# Patient Record
Sex: MALE | Race: WHITE | NOT HISPANIC OR LATINO
[De-identification: names, ages, dates, MRNs, and addresses within clinical notes are randomized per-mention and may not be internally consistent; named-entity substitution may affect disease eponyms.]

---

## 2018-08-22 ENCOUNTER — RECORDS - HEALTHEAST (OUTPATIENT)
Dept: ADMINISTRATIVE | Facility: OTHER | Age: 53
End: 2018-08-22

## 2018-08-27 ENCOUNTER — OFFICE VISIT - HEALTHEAST (OUTPATIENT)
Dept: CARDIOLOGY | Facility: CLINIC | Age: 53
End: 2018-08-27

## 2018-08-27 DIAGNOSIS — I71.20 THORACIC AORTIC ANEURYSM WITHOUT RUPTURE (H): ICD-10-CM

## 2018-08-27 DIAGNOSIS — I71.40 ABDOMINAL AORTIC ANEURYSM (AAA) WITHOUT RUPTURE (H): ICD-10-CM

## 2018-08-27 ASSESSMENT — MIFFLIN-ST. JEOR: SCORE: 1546.14

## 2018-09-05 ENCOUNTER — HOSPITAL ENCOUNTER (OUTPATIENT)
Dept: CARDIOLOGY | Facility: HOSPITAL | Age: 53
Discharge: HOME | End: 2018-09-05
Attending: INTERNAL MEDICINE

## 2018-09-05 DIAGNOSIS — I71.20 THORACIC AORTIC ANEURYSM WITHOUT RUPTURE (H): ICD-10-CM

## 2018-09-05 LAB
AORTIC ROOT: 3.1 CM
AORTIC VALVE MEAN VELOCITY: 83.4 CM/S
ASCENDING AORTA: 3.5 CM
AV DIMENSIONLESS INDEX VTI: 0.7
AV MEAN GRADIENT: 3 MMHG
AV PEAK GRADIENT: 6.1 MMHG
AV VALVE AREA: 3 CM2
AV VELOCITY RATIO: 0.7
BSA FOR ECHO PROCEDURE: 1.88 M2
CV BLOOD PRESSURE: NORMAL MMHG
CV ECHO HEIGHT: 69 IN
CV ECHO WEIGHT: 160 LBS
DOP CALC AO PEAK VEL: 123 CM/S
DOP CALC AO VTI: 22.9 CM
DOP CALC LVOT AREA: 4.15 CM2
DOP CALC LVOT DIAMETER: 2.3 CM
DOP CALC LVOT PEAK VEL: 84.2 CM/S
DOP CALC LVOT STROKE VOLUME: 68.1 CM3
DOP CALCLVOT PEAK VEL VTI: 16.4 CM
EJECTION FRACTION: 61 % (ref 55–75)
FRACTIONAL SHORTENING: 41.7 % (ref 28–44)
INTERVENTRICULAR SEPTUM IN END DIASTOLE: 0.89 CM (ref 0.6–1)
IVS/PW RATIO: 1.1
LEFT ATRIUM SIZE: 3.5 CM
LEFT VENTRICLE CARDIAC INDEX: 3.1 L/MIN/M2
LEFT VENTRICLE CARDIAC OUTPUT: 5.9 L/MIN
LEFT VENTRICLE DIASTOLIC VOLUME INDEX: 33.6 CM3/M2 (ref 34–74)
LEFT VENTRICLE DIASTOLIC VOLUME: 63.1 CM3 (ref 62–150)
LEFT VENTRICLE HEART RATE: 86 BPM
LEFT VENTRICLE MASS INDEX: 72.9 G/M2
LEFT VENTRICLE SYSTOLIC VOLUME INDEX: 13 CM3/M2 (ref 11–31)
LEFT VENTRICLE SYSTOLIC VOLUME: 24.5 CM3 (ref 21–61)
LEFT VENTRICULAR INTERNAL DIMENSION IN DIASTOLE: 4.87 CM (ref 4.2–5.8)
LEFT VENTRICULAR INTERNAL DIMENSION IN SYSTOLE: 2.84 CM (ref 2.5–4)
LEFT VENTRICULAR MASS: 137 G
LEFT VENTRICULAR OUTFLOW TRACT MEAN GRADIENT: 1 MMHG
LEFT VENTRICULAR OUTFLOW TRACT MEAN VELOCITY: 53.9 CM/S
LEFT VENTRICULAR OUTFLOW TRACT PEAK GRADIENT: 3 MMHG
LEFT VENTRICULAR POSTERIOR WALL IN END DIASTOLE: 0.78 CM (ref 0.6–1)
LV STROKE VOLUME INDEX: 36.2 ML/M2
MITRAL VALVE DECELERATION SLOPE: 2160 MM/S2
MITRAL VALVE E/A RATIO: 0.8
MITRAL VALVE PRESSURE HALF-TIME: 79 MS
MV AVERAGE E/E' RATIO: 7 CM/S
MV DECELERATION TIME: 271 MS
MV E'TISSUE VEL-LAT: 8.32 CM/S
MV E'TISSUE VEL-MED: 8.41 CM/S
MV LATERAL E/E' RATIO: 7
MV MEDIAL E/E' RATIO: 7
MV PEAK A VELOCITY: 77.7 CM/S
MV PEAK E VELOCITY: 58.5 CM/S
MV VALVE AREA PRESSURE 1/2 METHOD: 2.8 CM2
NUC REST DIASTOLIC VOLUME INDEX: 2560 LBS
NUC REST SYSTOLIC VOLUME INDEX: 69 IN
TRICUSPID VALVE ANULAR PLANE SYSTOLIC EXCURSION: 3 CM

## 2018-09-05 ASSESSMENT — MIFFLIN-ST. JEOR: SCORE: 1546.14

## 2018-09-14 ENCOUNTER — HOSPITAL ENCOUNTER (OUTPATIENT)
Dept: CT IMAGING | Facility: HOSPITAL | Age: 53
Discharge: HOME | End: 2018-09-14
Attending: INTERNAL MEDICINE

## 2018-09-14 DIAGNOSIS — I71.20 THORACIC AORTIC ANEURYSM WITHOUT RUPTURE (H): ICD-10-CM

## 2020-10-12 ENCOUNTER — OFFICE VISIT - HEALTHEAST (OUTPATIENT)
Dept: FAMILY MEDICINE | Facility: CLINIC | Age: 55
End: 2020-10-12

## 2020-10-12 DIAGNOSIS — Z12.5 SCREENING FOR PROSTATE CANCER: ICD-10-CM

## 2020-10-12 DIAGNOSIS — N50.89 LUMP IN THE TESTICLE: ICD-10-CM

## 2020-10-12 DIAGNOSIS — Z11.3 SCREENING EXAMINATION FOR SEXUALLY TRANSMITTED DISEASE: ICD-10-CM

## 2020-10-12 DIAGNOSIS — F15.10 AMPHETAMINE USE DISORDER, MILD (H): ICD-10-CM

## 2020-10-12 DIAGNOSIS — Z13.220 LIPID SCREENING: ICD-10-CM

## 2020-10-12 LAB
CHOLEST SERPL-MCNC: 171 MG/DL
FASTING STATUS PATIENT QL REPORTED: NO
HDLC SERPL-MCNC: 47 MG/DL
HIV 1+2 AB+HIV1 P24 AG SERPL QL IA: NEGATIVE
LDLC SERPL CALC-MCNC: 94 MG/DL
PSA SERPL-MCNC: 1.3 NG/ML (ref 0–3.5)
TRIGL SERPL-MCNC: 149 MG/DL

## 2020-10-12 ASSESSMENT — MIFFLIN-ST. JEOR: SCORE: 1560.87

## 2020-10-13 ENCOUNTER — COMMUNICATION - HEALTHEAST (OUTPATIENT)
Dept: FAMILY MEDICINE | Facility: CLINIC | Age: 55
End: 2020-10-13

## 2020-10-13 LAB
C TRACH DNA SPEC QL PROBE+SIG AMP: NEGATIVE
HCV AB SERPL QL IA: NEGATIVE
N GONORRHOEA DNA SPEC QL NAA+PROBE: NEGATIVE
T PALLIDUM AB SER QL: NEGATIVE

## 2020-10-16 ENCOUNTER — COMMUNICATION - HEALTHEAST (OUTPATIENT)
Dept: FAMILY MEDICINE | Facility: CLINIC | Age: 55
End: 2020-10-16

## 2020-10-21 ENCOUNTER — HOSPITAL ENCOUNTER (OUTPATIENT)
Dept: ULTRASOUND IMAGING | Facility: HOSPITAL | Age: 55
Discharge: HOME | End: 2020-10-21
Attending: FAMILY MEDICINE

## 2020-10-21 DIAGNOSIS — N50.89 LUMP IN THE TESTICLE: ICD-10-CM

## 2020-10-22 ENCOUNTER — COMMUNICATION - HEALTHEAST (OUTPATIENT)
Dept: FAMILY MEDICINE | Facility: CLINIC | Age: 55
End: 2020-10-22

## 2021-01-28 ENCOUNTER — OFFICE VISIT - HEALTHEAST (OUTPATIENT)
Dept: FAMILY MEDICINE | Facility: CLINIC | Age: 56
End: 2021-01-28

## 2021-01-28 DIAGNOSIS — Z00.00 ROUTINE GENERAL MEDICAL EXAMINATION AT A HEALTH CARE FACILITY: ICD-10-CM

## 2021-01-28 DIAGNOSIS — F15.10 AMPHETAMINE USE DISORDER, MILD (H): ICD-10-CM

## 2021-01-28 DIAGNOSIS — F17.210 CIGARETTE NICOTINE DEPENDENCE WITHOUT COMPLICATION: ICD-10-CM

## 2021-01-28 DIAGNOSIS — Z12.11 SCREEN FOR COLON CANCER: ICD-10-CM

## 2021-01-28 ASSESSMENT — MIFFLIN-ST. JEOR: SCORE: 1570.39

## 2021-06-01 VITALS — BODY MASS INDEX: 23.7 KG/M2 | HEIGHT: 69 IN | WEIGHT: 160 LBS

## 2021-06-05 VITALS
HEIGHT: 70 IN | HEART RATE: 93 BPM | WEIGHT: 162.2 LBS | BODY MASS INDEX: 23.22 KG/M2 | SYSTOLIC BLOOD PRESSURE: 134 MMHG | DIASTOLIC BLOOD PRESSURE: 58 MMHG

## 2021-06-05 VITALS
HEIGHT: 70 IN | SYSTOLIC BLOOD PRESSURE: 110 MMHG | DIASTOLIC BLOOD PRESSURE: 70 MMHG | BODY MASS INDEX: 22.82 KG/M2 | WEIGHT: 159.4 LBS | HEART RATE: 86 BPM

## 2021-06-12 NOTE — TELEPHONE ENCOUNTER
Please advise.    Component      Latest Ref Rng & Units 10/12/2020   Cholesterol      <=199 mg/dL 171   Triglycerides      <=149 mg/dL 149   HDL Cholesterol      >=40 mg/dL 47   LDL Calculated      <=129 mg/dL 94   Patient Fasting > 8hrs?       No   Chlamydia trachomatis, Amplified Detection      Negative Negative   Neisseria gonorrhoeae, Amplified Detection      Negative Negative   Treponema Antibody (Syphilis)      Negative Negative   HIV Antigen / Antibody      Negative Negative   Hepatitis C Ab      Negative Negative   PSA      0.0 - 3.5 ng/mL 1.3

## 2021-06-12 NOTE — TELEPHONE ENCOUNTER
Test Results  Who is calling?:  Patient  Who ordered the test:  Dang Brunson MD  Type of test: Lab  Date of test:  10/12/2020  Where was the test performed:  In clinic  What are your questions/concerns?:  Pt would like results  Okay to leave a detailed message?:  No

## 2021-06-12 NOTE — PROGRESS NOTES
"  Assessment/Plan:       1. Lump in the testicle  Recommended an ultrasound for further investigation.  This was ordered today.  - US Scrotum and Testicles W Duplex Ltd; Future    2. Screening examination for sexually transmitted disease  After discussion of available options, patient wishes to have the below sexually transmitted infection screening performed.  - Syphilis Screen, Cascade  - HIV Antigen/Antibody Screening Cascade  - Chlamydia trachomatis & Neisseria gonorrhoeae, Amplified Detection  - Hepatitis C Antibody (Anti-HCV)    3. Screening for prostate cancer  After discussion of risks and benefits, patient wishes to have PSA screening performed.  His prostate exam is normal today.  - PSA, Annual Screen (Prostatic-Specific Antigen)    4. Lipid screening  Random lipid profile updated today as we are drawing labs anyway.  - Lipid Cascade RANDOM    5. Amphetamine use disorder, mild (H)  Patient reports that he continues to use methamphetamine and THC approximately once weekly.        Subjective:       Jacob Duncan is a 55 y.o. male who presents for a visit to discuss \"a rectal issue\".  He also would like to have sexually transmitted infection testing performed.  He denies any ongoing symptoms, states his girlfriend made him come in today for a prostate check.  It sounds like she just wanted him to have a routine checkup.  He states that he does not get up more than once at night to urinate.  He has some urinary urgency, no weak stream, no trouble starting his stream.  He has no known family history of prostate cancer.  He has had one new sexual partner, denies any symptoms of sexually transmitted infections.  He continues to use methamphetamine and marijuana approximately once weekly he states.  He denies any IV drug use.  He has had a lump in his left testicle that seems to be enlarging over time he states.  This can be painful.  He thinks his last colonoscopy was done about 6 years ago and he thinks this was " "normal.  Had him sign a release of information for these records today.  He also continues to smoke 3 cigarettes daily.    The following portions of the patient's history were reviewed and updated as appropriate: allergies, current medications, past family history, past medical history, past social history, past surgical history and problem list.      Current Outpatient Medications:      albuterol (PROVENTIL HFA;VENTOLIN HFA) 90 mcg/actuation inhaler, Inhale 2 puffs every 6 (six) hours as needed for wheezing., Disp: 1 Inhaler, Rfl: 3     prazosin (MINIPRESS) 1 MG capsule, Take 3 capsules (3 mg total) by mouth bedtime. Take one mg  At bedtime for a week, then 2 mg at bedtime for a week, then 3 mg at bedtime, Disp: 70 capsule, Rfl: 0     sertraline (ZOLOFT) 50 MG tablet, Take 1 tablet (50 mg total) by mouth daily., Disp: 30 tablet, Rfl: 0    Review of Systems   A 12 point comprehensive review of systems was negative except as noted.      Objective:      /70 (Patient Site: Left Arm, Patient Position: Sitting, Cuff Size: Adult Regular)   Pulse 86   Ht 5' 10.1\" (1.781 m)   Wt 159 lb 6.4 oz (72.3 kg)   BMI 22.81 kg/m      General appearance: alert, appears stated age and cooperative  Head: Normocephalic, without obvious abnormality, atraumatic  Eyes: Conjunctivae clear, sclerae anicteric  Lungs: clear to auscultation bilaterally  Heart: regular rate and rhythm, S1, S2 normal, no murmur, click, rub or gallop  Male genitalia: Penis appears normal, circumcised, no discharge; right testicle palpates normally, left testicle with a round, firm lump in the inferior portion of the testicle, approximately 2 cm in diameter; no palpable inguinal hernias  Rectal: normal tone, normal prostate, no masses or tenderness  Neurologic: Grossly normal          "
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
## 2021-06-16 PROBLEM — N50.89 LUMP IN THE TESTICLE: Status: ACTIVE | Noted: 2020-10-12

## 2021-06-16 PROBLEM — F17.210 CIGARETTE NICOTINE DEPENDENCE WITHOUT COMPLICATION: Status: ACTIVE | Noted: 2021-01-28

## 2021-06-20 NOTE — LETTER
Letter by Dang Brunson MD at      Author: Dang Brunson MD Service: -- Author Type: --    Filed:  Encounter Date: 10/13/2020 Status: (Other)         Jacob Duncan  [address] [facility] [address]             October 13, 2020         Dear Mr. Duncan,    Below are the results from your recent visit:    Resulted Orders   Lipid Cascade RANDOM   Result Value Ref Range    Cholesterol 171 <=199 mg/dL    Triglycerides 149 <=149 mg/dL    HDL Cholesterol 47 >=40 mg/dL    LDL Calculated 94 <=129 mg/dL    Patient Fasting > 8hrs? No    Syphilis Screen, Cascade   Result Value Ref Range    Treponema Antibody (Syphilis) Negative Negative   HIV Antigen/Antibody Screening Cascade   Result Value Ref Range    HIV Antigen / Antibody Negative Negative    Narrative    Method is Abbott HIV Ag/Ab for the detection of HIV p24 antigen, HIV-1 antibodies and HIV-2 antibodies.   Chlamydia trachomatis & Neisseria gonorrhoeae, Amplified Detection   Result Value Ref Range    Chlamydia trachomatis, Amplified Detection Negative Negative    Neisseria gonorrhoeae, Amplified Detection Negative Negative   Hepatitis C Antibody (Anti-HCV)   Result Value Ref Range    Hepatitis C Ab Negative Negative   PSA, Annual Screen (Prostatic-Specific Antigen)   Result Value Ref Range    PSA 1.3 0.0 - 3.5 ng/mL    Narrative    Method is Abbott Prostate-Specific Antigen (PSA)  Standard-WHO 1st International (90:10)       Your sexually transmitted infection testing all returned normal/negative.  Your prostate test (PSA) is normal.  Your cholesterol is within goal range also.    Please call with questions or contact us using [employer]    Sincerely,        Electronically signed by Dang Brunson MD

## 2021-06-21 NOTE — LETTER
Letter by Dang Brunson MD at      Author: Dang Brunson MD Service: -- Author Type: --    Filed:  Encounter Date: 10/22/2020 Status: (Other)         Jacob Duncan  [address] [facility] [address]             October 22, 2020         Dear Mr. Duncan,    Below are the results from your recent visit:    Resulted Orders   US Scrotum and Testicles W Duplex Ltd    Narrative    EXAM: US SCROTUM AND TESTICLES WITH DUPLEX LIMITED  LOCATION: [facility]  DATE/TIME: 10/21/2020 1:53 PM    INDICATION: Enlarging lump inferior left testicle.  COMPARISON: 04/08/2016.  TECHNIQUE: Ultrasound of scrotum with color flow and spectral Doppler with waveform analysis performed.    FINDINGS:    RIGHT: Right testicle measures 4.4 x 2.6 x 2.7 cm. Normal testicle with no masses. Normal arterial duplex and normal color flow. Normal epididymis. No hydrocele. No varicocele. Small 4 mm right scrotal khushbu, incidental.    LEFT: Left testicle measures 3.8 x 2.4 x 2.4 cm. Normal testicle with no masses. Normal arterial duplex and normal color flow. Asymmetric enlargement and heterogeneity of the left epididymis. Perhaps modest epididymal hypervascularity. Inferior left   epididymal 1.6 x 1.3 x 0.9 cm cyst. No hydrocele. No varicocele.      Impression    1.  Asymmetrically enlarged and heterogeneous left epididymis suggesting sequela of epididymitis (age-indeterminate). If enlargement continues, recommend attention on follow-up.    2.  Inferior left epididymis 1.6 cm cyst.    3.  No testicular mass.      NOTE: ABNORMAL REPORT    THE DICTATION ABOVE DESCRIBES AN ABNORMALITY FOR WHICH FOLLOW-UP IS NEEDED.          The lump is a simple cyst, and is not within the testicle, but within the epididymis, which is separate from the testicle.  This does not need further follow-up unless you feel that it continues to grow.  I would recommend regular follow-up for yearly physicals and we can examine it then.   Keep checking on your own as well.    Please call with questions or contact us using [email or web address].    Sincerely,        Electronically signed by Dang Brunson MD
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
## 2021-08-03 PROBLEM — I71.40 ABDOMINAL AORTIC ANEURYSM (AAA) WITHOUT RUPTURE (H): Status: RESOLVED | Noted: 2018-08-27 | Resolved: 2018-08-27

## 2021-08-03 PROBLEM — I71.20 THORACIC AORTIC ANEURYSM WITHOUT RUPTURE (H): Status: RESOLVED | Noted: 2018-08-27 | Resolved: 2021-01-28

## 2023-02-15 ENCOUNTER — NURSE TRIAGE (OUTPATIENT)
Dept: FAMILY MEDICINE | Facility: CLINIC | Age: 58
End: 2023-02-15
Payer: COMMERCIAL

## 2023-02-15 ENCOUNTER — TELEPHONE (OUTPATIENT)
Dept: FAMILY MEDICINE | Facility: CLINIC | Age: 58
End: 2023-02-15
Payer: COMMERCIAL

## 2023-02-15 NOTE — TELEPHONE ENCOUNTER
Please review lab results with Dr Pena Pencil and call patient with results  RN COVID TREATMENT VISIT  02/15/23      The patient has been triaged and does not require a higher level of care.    Jacob Duncan  58 year old  Current weight? 165    Has the patient been seen by a primary care provider at an [facility] or [facility] within the past two years? No, therefore patient is not eligible for COVID treatment standing order. Patient informed a virtual visit with a provider will be required for treatment. Assisted in scheduling patient a VV with provider for tomorrow 2/15/23 at 0700, advised someone will reach out around 0650 to begin.    Karina Jackson RN        Reason for Disposition    [1] Fever > 100.0 F (37.8 C) AND [2] bedridden (e.g., nursing home patient, CVA, chronic illness, recovering from surgery)    Additional Information    Negative: SEVERE difficulty breathing (e.g., struggling for each breath, speaks in single words)    Negative: Difficult to awaken or acting confused (e.g., disoriented, slurred speech)    Negative: Bluish (or gray) lips or face now    Negative: Shock suspected (e.g., cold/pale/clammy skin, too weak to stand, low BP, rapid pulse)    Negative: Sounds like a life-threatening emergency to the triager    Negative: [1] Diagnosed or suspected COVID-19 AND [2] symptoms lasting 3 or more weeks    Negative: [1] COVID-19 exposure AND [2] no symptoms    Negative: COVID-19 vaccine reaction suspected (e.g., fever, headache, muscle aches) occurring 1 to 3 days after getting vaccine    Negative: COVID-19 vaccine, questions about    Negative: [1] Lives with someone known to have influenza (flu test positive) AND [2] flu-like symptoms (e.g., cough, runny nose, sore throat, SOB; with or without fever)    Negative: [1] Adult with possible COVID-19 symptoms AND [2] triager concerned about severity of symptoms or other causes    Negative: COVID-19 and breastfeeding, questions about    Negative: SEVERE or constant chest pain or pressure  (Exception:  Mild central chest pain, present only when coughing.)    Negative: MODERATE difficulty breathing (e.g., speaks in phrases, SOB even at rest, pulse 100-120)    Negative: Headache and stiff neck (can't touch chin to chest)    Negative: Oxygen level (e.g., pulse oximetry) 90 percent or lower    Negative: Chest pain or pressure  (Exception: MILD central chest pain, present only when coughing)    Negative: Patient sounds very sick or weak to the triager    Negative: MILD difficulty breathing (e.g., minimal/no SOB at rest, SOB with walking, pulse <100)    Negative: Fever > 103 F (39.4 C)    Negative: [1] Fever > 101 F (38.3 C) AND [2] over 60 years of age    Protocols used: CORONAVIRUS (COVID-19) DIAGNOSED OR [id number]

## 2023-02-15 NOTE — TELEPHONE ENCOUNTER
Incoming call from pt's SO, pt is requesting a call back, he tested positive for COVID 2/14, would like a prescription for medication. Please call SO's phone [phone], pt will answer

## 2023-02-16 ENCOUNTER — VIRTUAL VISIT (OUTPATIENT)
Dept: FAMILY MEDICINE | Facility: CLINIC | Age: 58
End: 2023-02-16
Payer: COMMERCIAL

## 2023-02-16 DIAGNOSIS — U07.1 INFECTION DUE TO 2019 NOVEL CORONAVIRUS: Primary | ICD-10-CM

## 2023-02-16 DIAGNOSIS — J66.0: ICD-10-CM

## 2023-02-16 PROCEDURE — 99441 PR PHYSICIAN TELEPHONE EVALUATION 5-10 MIN: CPT | Performed by: PHYSICIAN ASSISTANT

## 2023-02-16 NOTE — PROGRESS NOTES
Jacob is a 58 year old who is being evaluated via a billable telephone visit.      What phone number would you like to be contacted at? [phone]  How would you like to obtain your AVS? [employer]    Distant Location (provider location):  Off-site    Assessment & Plan       ICD-10-CM    1. Infection due to 2019 novel coronavirus  U07.1 nirmatrelvir and ritonavir (PAXLOVID) therapy pack      2. 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 No dose adjustment needed. 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 Supportive therapy also discussed. Follow up if symptoms fail to improve or worsen.       Prescription drug management         Nicotine/Tobacco Cessation:  He reports that he has been smoking cigarettes. He has been smoking an average of .25 packs per day. He has never used smokeless tobacco.  Nicotine/Tobacco Cessation Plan:     Return in about 1 week (around 2/23/2023), or if symptoms worsen or fail to improve.    Gely Boyce PA-C  [facility] ALYX James is a 58 year old, presenting for the following health issues:  Covid Concern      HPI       COVID-19 Symptom Review  How many days ago did these symptoms start? 4 days   Tested positive on Monday    Are any of the following symptoms significant for you?    New or worsening difficulty breathing? No    Worsening cough? No    Fever or chills? Yes, the highest temperature was 101    Headache: YES    Sore throat: YES    Chest pain: YES, pain on deep breathing    Diarrhea: No    Body aches? YES    What treatments has patient tried? nothing   Does patient live in a nursing home, group home, or shelter? No  Does patient have a way to get food/medications during quarantined? Yes, I have a friend or family member who can help me.            Review of Systems   Constitutional, HEENT, cardiovascular, pulmonary, gi  and gu systems are negative, except as otherwise noted.      Objective           Vitals:  No vitals were obtained today due to virtual visit.    Physical Exam   healthy, alert and no distress  PSYCH: Alert and oriented times 3; coherent speech, normal   rate and volume, able to articulate logical thoughts, able   to abstract reason, no tangential thoughts, no hallucinations   or delusions  His affect is normal and pleasant  RESP: No cough, no audible wheezing, able to talk in full sentences  Remainder of exam unable to be completed due to telephone visits          Phone call duration: 7 minutes